# Patient Record
Sex: FEMALE | Race: WHITE | NOT HISPANIC OR LATINO | URBAN - METROPOLITAN AREA
[De-identification: names, ages, dates, MRNs, and addresses within clinical notes are randomized per-mention and may not be internally consistent; named-entity substitution may affect disease eponyms.]

---

## 2017-01-13 ENCOUNTER — APPOINTMENT (RX ONLY)
Dept: URBAN - METROPOLITAN AREA SURGERY 2 | Facility: SURGERY | Age: 72
Setting detail: DERMATOLOGY
End: 2017-01-13

## 2017-01-13 DIAGNOSIS — Z41.9 ENCOUNTER FOR PROCEDURE FOR PURPOSES OTHER THAN REMEDYING HEALTH STATE, UNSPECIFIED: ICD-10-CM

## 2017-01-13 PROBLEM — E78.5 HYPERLIPIDEMIA, UNSPECIFIED: Status: ACTIVE | Noted: 2017-01-13

## 2017-01-13 PROCEDURE — ? BOTOX

## 2017-01-13 PROCEDURE — ? FILLERS

## 2017-01-13 NOTE — PROCEDURE: BOTOX
Bill Summary Price Listed Below, Or Bill Total Of Units X Price Per Unit?: Bill #Units x Price Per Unit
Additional Area 3 Units: 0
Glabellar Complex Units: 1.5
Lot #: [id number]
Show Price In Note?: yes
Expiration Date (Month Year): 08/19
Consent: Written consent was obtained prior to the procedure. Risks, benefits, expectations and alternatives were discussed including, but not limited to, infection, bleeding, lid/brow ptosis, bruising, swelling, diplopia, temporary effects, incomplete chemical denervation and dissatisfaction with the cosmetic outcome. No guarantee or warranty was given or implied regarding longevity of results.
Map Statment: See attached map for complete details
Price Per Unit In $ (Use Numbers Only, No Text Please.): 17
Additional Area 1 Location: Eyebrows rhytids
Dilution (U/0.1 Cc): 5
Use Map Statement For Sites (Optional): No
Additional Area 2 Location: Chin
Detail Level: Simple
Administered By (Optional): Maria T Jaeger RN
Postcare Instructions: Patient instructed to not lie down for 4 hours and limit physical activity for 24 hours. Patient instructed not to travel by airplane for 48 hours.\\n\\n*****
Additional Area 3 Location: Eyebrow

## 2017-01-13 NOTE — PROCEDURE: FILLERS
Marionette Lines Filler Volume In Cc: 0
Additional Area 1 Volume In Cc: 0.2
Detail Level: Simple
Show Price In Note?: yes
Additional Area 1 Location: Glabella
Administered By (Optional): Maria T Jaeger
Additional Area 2 Location: Sabianism divot
Map Statment: See attached map for complete details
Additional Area 4 Location: [facility]
Use Map Statement For Sites (Optional): No
Filler: Juvederm Ultra XC
Consent: Written consent obtained. Risks include but not limited to bruising, beading, irregular texture, ulceration, infection, allergic reaction, scar formation, incomplete augmentation, temporary nature, procedural pain.
Lot #: [id number]
Price $ (Numeric Only, No Text Please.): 839
Expiration Date (Month Year): 03/18

## 2017-02-14 ENCOUNTER — APPOINTMENT (RX ONLY)
Dept: URBAN - METROPOLITAN AREA SURGERY 2 | Facility: SURGERY | Age: 72
Setting detail: DERMATOLOGY
End: 2017-02-14

## 2017-02-14 DIAGNOSIS — Z428 OTHER AFTERCARE INVOLVING THE USE OF PLASTIC SURGERY: ICD-10-CM

## 2017-02-14 DIAGNOSIS — Z41.9 ENCOUNTER FOR PROCEDURE FOR PURPOSES OTHER THAN REMEDYING HEALTH STATE, UNSPECIFIED: ICD-10-CM

## 2017-02-14 PROCEDURE — ? BOTOX

## 2017-02-14 PROCEDURE — ? FILLERS

## 2017-02-14 NOTE — PROCEDURE: FILLERS
Additional Area 4 Location: [facility]
Additional Area 4 Volume In Cc: 0
Map Statment: See attached map for complete details
Show Price In Note?: yes
Consent: Written consent obtained. Risks include but not limited to bruising, beading, irregular texture, ulceration, infection, allergic reaction, scar formation, incomplete augmentation, temporary nature, procedural pain.
Additional Area 1 Location: Periorbital scar
Use Map Statement For Sites (Optional): No
Administered By (Optional): Maria T Jaeger
Lot #: [id number]
Detail Level: Simple
Additional Area 2 Location: Oriental orthodox divot
Filler: Juvederm Ultra XC

## 2017-02-14 NOTE — PROCEDURE: BOTOX
Detail Level: Simple
Additional Area 1 Units: 0
Expiration Date (Month Year): 09/19
Map Statment: See attached map for complete details
Lot #: [id number]
Glabellar Complex Units: 10
Additional Area 1 Location: Eyebrows rhytids
Additional Area 3 Location: Eyebrow
Dilution (U/0.1 Cc): 5
Additional Area 2 Location: Chin
Postcare Instructions: Patient instructed to not lie down for 4 hours and limit physical activity for 24 hours. Patient instructed not to travel by airplane for 48 hours.\\n\\n*****
Use Map Statement For Sites (Optional): No
Price Per Unit In $ (Use Numbers Only, No Text Please.): 17
Bill Summary Price Listed Below, Or Bill Total Of Units X Price Per Unit?: Bill #Units x Price Per Unit
Consent: Written consent was obtained prior to the procedure. Risks, benefits, expectations and alternatives were discussed including, but not limited to, infection, bleeding, lid/brow ptosis, bruising, swelling, diplopia, temporary effects, incomplete chemical denervation and dissatisfaction with the cosmetic outcome. No guarantee or warranty was given or implied regarding longevity of results.
Show Price In Note?: yes
Administered By (Optional): Maria T Jaeger RN

## 2017-04-04 ENCOUNTER — APPOINTMENT (RX ONLY)
Dept: URBAN - METROPOLITAN AREA SURGERY 2 | Facility: SURGERY | Age: 72
Setting detail: DERMATOLOGY
End: 2017-04-04

## 2017-04-04 DIAGNOSIS — Z41.9 ENCOUNTER FOR PROCEDURE FOR PURPOSES OTHER THAN REMEDYING HEALTH STATE, UNSPECIFIED: ICD-10-CM

## 2017-04-04 PROBLEM — E78.5 HYPERLIPIDEMIA, UNSPECIFIED: Status: ACTIVE | Noted: 2017-04-04

## 2017-04-04 PROCEDURE — ? FILLERS

## 2017-04-04 NOTE — PROCEDURE: FILLERS
Additional Area 4 Location: [facility]
Topical Anesthesia?: yes
Mental Crease Filler Volume In Cc: 0
Use Map Statement For Sites (Optional): No
Consent: Written consent obtained. Risks include but not limited to bruising, beading, irregular texture, ulceration, infection, allergic reaction, scar formation, incomplete augmentation, temporary nature, procedural pain.
Detail Level: Simple
Additional Area 2 Location: Forehead depression
Additional Area 1 Volume In Cc: 0.1
Administered By (Optional): Maria T Jaeger
Lot #: [id number]
Additional Area 1 Location: Glabellar lines
Additional Area 1 Location: Nasal dorsum
Map Statment: See attached map for complete details
Filler: Juvederm Ultra XC

## 2017-06-20 ENCOUNTER — APPOINTMENT (RX ONLY)
Dept: URBAN - METROPOLITAN AREA SURGERY 2 | Facility: SURGERY | Age: 72
Setting detail: DERMATOLOGY
End: 2017-06-20

## 2017-06-20 DIAGNOSIS — Z41.9 ENCOUNTER FOR PROCEDURE FOR PURPOSES OTHER THAN REMEDYING HEALTH STATE, UNSPECIFIED: ICD-10-CM

## 2017-06-20 PROCEDURE — ? FILLERS

## 2017-06-20 PROCEDURE — ? BOTOX

## 2017-06-20 NOTE — PROCEDURE: BOTOX
Map Statment: See attached map for complete details
Platsyma Units: 0
Use Map Statement For Sites (Optional): No
Additional Area 3 Location: Left lower lid muscle
Postcare Instructions: Patient instructed to not lie down for 4 hours and limit physical activity for 24 hours. Patient instructed not to travel by airplane for 48 hours.\\n\\n*****
Forehead Units: 2.5
Expiration Date (Month Year): 01/20
Consent: Written consent was obtained prior to the procedure. Risks, benefits, expectations and alternatives were discussed including, but not limited to, infection, bleeding, lid/brow ptosis, bruising, swelling, diplopia, temporary effects, incomplete chemical denervation and dissatisfaction with the cosmetic outcome. No guarantee or warranty was given or implied regarding longevity of results.
Glabellar Complex Units: 12.5
Additional Area 1 Location: Necklace lines
Detail Level: Simple
Price Per Unit In $ (Use Numbers Only, No Text Please.): 17
Dilution (U/0.1 Cc): 5
Administered By (Optional): Maria T Jaeger RN
Additional Area 2 Location: Chin
Show Price In Note?: yes
Lot #: [id number]
Bill Summary Price Listed Below, Or Bill Total Of Units X Price Per Unit?: Bill #Units x Price Per Unit

## 2017-06-20 NOTE — PROCEDURE: FILLERS
Nasolabial Folds Filler Volume In Cc: 0
Additional Area 1 Location: Perioral
Detail Level: Simple
Map Statment: See attached map for complete details
Additional Area 5 Location: Lips
Additional Area 2 Location: ear lobes
Additional Area 3 Location: Ear lobe
Jawline Filler Volume In Cc: 0.2
Consent: Written consent obtained. Risks include but not limited to bruising, beading, irregular texture, ulceration, infection, allergic reaction, scar formation, incomplete augmentation, temporary nature, procedural pain.
Additional Area 4 Location: forehead depressions
Additional Area 1 Location: Oral commissures
Administered By (Optional): Maria T Jaeger
Use Map Statement For Sites (Optional): No
Topical Anesthesia?: yes
Expiration Date (Month Year): 03/2018
Topical Anesthetic Base: ointment
Filler: Juvederm Ultra XC
Lot #: [id number]
Additional Area 2 Location: Glabellar

## 2017-10-10 ENCOUNTER — APPOINTMENT (RX ONLY)
Dept: URBAN - METROPOLITAN AREA SURGERY 2 | Facility: SURGERY | Age: 72
Setting detail: DERMATOLOGY
End: 2017-10-10

## 2017-10-10 DIAGNOSIS — Z41.9 ENCOUNTER FOR PROCEDURE FOR PURPOSES OTHER THAN REMEDYING HEALTH STATE, UNSPECIFIED: ICD-10-CM

## 2017-10-10 PROCEDURE — ? BOTOX

## 2017-10-10 NOTE — PROCEDURE: BOTOX
Additional Area 3 Location: DTI
Additional Area 2 Location: Nasolabial fold
Additional Area 1 Location: chin
Lot #: [id number]
Consent: Written consent was obtained prior to the procedure. Risks, benefits, expectations and alternatives were discussed including, but not limited to, infection, bleeding, lid/brow ptosis, bruising, swelling, diplopia, temporary effects, incomplete chemical denervation and dissatisfaction with the cosmetic outcome. No guarantee or warranty was given or implied regarding longevity of results.
Dilution (U/0.1 Cc): 5
Nasal Root Units: 0
Price Per Unit In $ (Use Numbers Only, No Text Please.): 17
Glabellar Complex Units: 15
Show Price In Note?: yes
Use Map Statement For Sites (Optional): No
Expiration Date (Month Year): 04/20
Detail Level: Simple
Map Statment: See attached map for complete details
Bill Summary Price Listed Below, Or Bill Total Of Units X Price Per Unit?: Bill #Units x Price Per Unit
Postcare Instructions: Patient instructed to not lie down for 4 hours and limit physical activity for 24 hours. Patient instructed not to travel by airplane for 48 hours.\\n\\n*****
Administered By (Optional): Maria T Jaeger RN

## 2017-10-12 ENCOUNTER — APPOINTMENT (RX ONLY)
Dept: URBAN - METROPOLITAN AREA SURGERY 2 | Facility: SURGERY | Age: 72
Setting detail: DERMATOLOGY
End: 2017-10-12

## 2017-10-12 DIAGNOSIS — Z42.8 ENCOUNTER FOR OTHER PLASTIC AND RECONSTRUCTIVE SURGERY FOLLOWING MEDICAL PROCEDURE OR HEALED INJURY: ICD-10-CM

## 2017-10-12 PROBLEM — E78.5 HYPERLIPIDEMIA, UNSPECIFIED: Status: ACTIVE | Noted: 2017-10-12

## 2017-10-12 PROCEDURE — ? BOTOX

## 2017-10-12 NOTE — PROCEDURE: BOTOX
Additional Area 3 Location: DTI
Detail Level: Simple
Lot #: [id number]
Additional Area 2 Location: Nasolabial fold
Perioral Units: 0
Map Statment: See attached map for complete details
Administered By (Optional): Maria T Jaeger RN
Expiration Date (Month Year): 03/20
Postcare Instructions: Patient instructed to not lie down for 4 hours and limit physical activity for 24 hours. Patient instructed not to travel by airplane for 48 hours.\\n\\n*****
Dilution (U/0.1 Cc): 5
Bill Summary Price Listed Below, Or Bill Total Of Units X Price Per Unit?: Bill #Units x Price Per Unit
Show Price In Note?: yes
Glabellar Complex Units: 0.5
Use Map Statement For Sites (Optional): No
Consent: Written consent was obtained prior to the procedure. Risks, benefits, expectations and alternatives were discussed including, but not limited to, infection, bleeding, lid/brow ptosis, bruising, swelling, diplopia, temporary effects, incomplete chemical denervation and dissatisfaction with the cosmetic outcome. No guarantee or warranty was given or implied regarding longevity of results.
Additional Area 1 Location: chin

## 2018-01-16 ENCOUNTER — APPOINTMENT (RX ONLY)
Dept: URBAN - METROPOLITAN AREA CLINIC 12 | Facility: CLINIC | Age: 73
Setting detail: DERMATOLOGY
End: 2018-01-16

## 2018-01-16 DIAGNOSIS — Z41.9 ENCOUNTER FOR PROCEDURE FOR PURPOSES OTHER THAN REMEDYING HEALTH STATE, UNSPECIFIED: ICD-10-CM

## 2018-01-16 PROCEDURE — ? BOTOX

## 2018-01-16 NOTE — PROCEDURE: BOTOX
Glabellar Complex Units: 12.5
Additional Area 3 Location: necklace lines
Platsyma Units: 0
Additional Area 1 Location: chin
Lot #: [id number]
Show Price In Note?: yes
Postcare Instructions: Patient instructed to not lie down for 4 hours and limit physical activity for 24 hours. Patient instructed not to travel by airplane for 48 hours.
Consent: Written consent was obtained prior to the procedure. Risks, benefits, expectations and alternatives were discussed including, but not limited to, infection, bleeding, lid/brow ptosis, bruising, swelling, diplopia, temporary effects, incomplete chemical denervation and dissatisfaction with the cosmetic outcome. No guarantee or warranty was given or implied regarding longevity of results.
Bill Summary Price Listed Below, Or Bill Total Of Units X Price Per Unit?: Bill #Units x Price Per Unit
Topical Anesthesia?: 23% lidocaine, 7% tetracaine
Dilution (U/0.1 Cc): 5
Administered By (Optional): Maria T Jaeger
Price Per Unit In $ (Use Numbers Only, No Text Please.): 17
Detail Level: Simple
Forehead Units: 2.5
Periorbital Skin Units: 7.5
Map Statment: See attached map for complete details
Additional Area 2 Location: muscle spasm
Expiration Date (Month Year): 08/20

## 2018-04-17 ENCOUNTER — APPOINTMENT (RX ONLY)
Dept: URBAN - METROPOLITAN AREA CLINIC 12 | Facility: CLINIC | Age: 73
Setting detail: DERMATOLOGY
End: 2018-04-17

## 2018-04-17 DIAGNOSIS — Z41.9 ENCOUNTER FOR PROCEDURE FOR PURPOSES OTHER THAN REMEDYING HEALTH STATE, UNSPECIFIED: ICD-10-CM

## 2018-04-17 PROCEDURE — ? BOTOX

## 2018-04-17 NOTE — PROCEDURE: BOTOX
Price Per Unit In $ (Use Numbers Only, No Text Please.): 17
Administered By (Optional): Maria T Jaeger
Dilution (U/0.1 Cc): 5
Glabellar Complex Units: 0
Show Price In Note?: yes
Map Statment: See attached map for complete details
Additional Area 2 Location: nasalis depressor
Bill Summary Price Listed Below, Or Bill Total Of Units X Price Per Unit?: Bill #Units x Price Per Unit
Lot #: [id number]
Expiration Date (Month Year): November 2020
Length Of Topical Anesthesia Application (Optional): 10 minutes
Forehead Units: 15
Periorbital Skin Units: 10
Additional Area 3 Location: tail end of brows
Postcare Instructions: Patient instructed to not lie down for 4 hours and limit physical activity for 24 hours. Patient instructed not to travel by airplane for 48 hours.
Consent: Written consent was obtained prior to the procedure. Risks, benefits, expectations and alternatives were discussed including, but not limited to, infection, bleeding, lid/brow ptosis, bruising, swelling, diplopia, temporary effects, incomplete chemical denervation and dissatisfaction with the cosmetic outcome. No guarantee or warranty was given or implied regarding longevity of results.
Topical Anesthesia?: 20% benzocaine, 8% lidocaine, 4% tetracaine
Detail Level: Simple
Additional Area 1 Location: chin

## 2018-04-25 ENCOUNTER — APPOINTMENT (RX ONLY)
Dept: URBAN - METROPOLITAN AREA CLINIC 12 | Facility: CLINIC | Age: 73
Setting detail: DERMATOLOGY
End: 2018-04-25

## 2018-04-25 PROCEDURE — ? CHEMICAL PEEL (CUSTOM)

## 2018-04-25 NOTE — PROCEDURE: CHEMICAL PEEL (CUSTOM)
Prep: Patient's face was washed with LHA Cleanser and prepped with prepping solution prior to treatment.  The entire face and neck were then dermaplaned with a sterile dermaplane blade.  Vaseline was then applied for protection of mucous membranes.
Number Of Coats: 2
Detail Level: Zone
Frostin
Price $ (Use Numbers Only, No Text Please.): 180
Erythema: mild
Consent: Prior to the procedure, written consent was obtained and risks, benefits, expectations and alternatives were reviewed, including, but not limited to,  redness, peeling, blistering, pigmentary change, scarring, infection, and pain.
Treatment Number: 1

## 2018-06-14 ENCOUNTER — APPOINTMENT (RX ONLY)
Dept: URBAN - METROPOLITAN AREA CLINIC 12 | Facility: CLINIC | Age: 73
Setting detail: DERMATOLOGY
End: 2018-06-14

## 2018-06-14 DIAGNOSIS — Z41.9 ENCOUNTER FOR PROCEDURE FOR PURPOSES OTHER THAN REMEDYING HEALTH STATE, UNSPECIFIED: ICD-10-CM

## 2018-06-14 PROCEDURE — ? CHEMICAL PEEL (CUSTOM)

## 2018-06-14 NOTE — PROCEDURE: CHEMICAL PEEL (CUSTOM)
Detail Level: Zone
Treatment Number: 2
Frostin
Prep: Patient's face was washed with GentlenCleanser and prepped with prepping solution prior to treatment.  The entire face and neck were then dermaplaned with a sterile dermaplane blade.  Vaseline was then applied for protection of mucous membranes.
Consent: Prior to the procedure, written consent was obtained and risks, benefits, expectations and alternatives were reviewed, including, but not limited to,  redness, peeling, blistering, pigmentary change, scarring, infection, and pain.
Price $ (Use Numbers Only, No Text Please.): 180
Erythema: mild

## 2018-06-20 ENCOUNTER — APPOINTMENT (RX ONLY)
Dept: URBAN - METROPOLITAN AREA CLINIC 12 | Facility: CLINIC | Age: 73
Setting detail: DERMATOLOGY
End: 2018-06-20

## 2018-06-20 DIAGNOSIS — Z41.9 ENCOUNTER FOR PROCEDURE FOR PURPOSES OTHER THAN REMEDYING HEALTH STATE, UNSPECIFIED: ICD-10-CM

## 2018-06-20 PROCEDURE — ? BOTOX

## 2018-06-20 NOTE — PROCEDURE: BOTOX
Periorbital Skin Units: 10
Additional Area 1 Units: 0
Price Per Unit In $ (Use Numbers Only, No Text Please.): 17
Additional Area 2 Location: nasalis depressor
Administered By (Optional): Maria T Jaeger
Additional Area 3 Location: orbicularis
Show Price In Note?: yes
Topical Anesthesia?: 23% lidocaine, 7% tetracaine
Lot #: [id number]
Bill Summary Price Listed Below, Or Bill Total Of Units X Price Per Unit?: Bill #Units x Price Per Unit
Detail Level: Simple
Map Statment: See attached map for complete details
Dilution (U/0.1 Cc): 5
Postcare Instructions: Patient instructed to not lie down for 4 hours and limit physical activity for 24 hours. Patient instructed not to travel by airplane for 48 hours.
Additional Area 1 Location: chin
Expiration Date (Month Year): 12/2020
Consent: Written consent was obtained prior to the procedure. Risks, benefits, expectations and alternatives were discussed including, but not limited to, infection, bleeding, lid/brow ptosis, bruising, swelling, diplopia, temporary effects, incomplete chemical denervation and dissatisfaction with the cosmetic outcome. No guarantee or warranty was given or implied regarding longevity of results.

## 2018-08-09 ENCOUNTER — APPOINTMENT (RX ONLY)
Dept: URBAN - METROPOLITAN AREA CLINIC 12 | Facility: CLINIC | Age: 73
Setting detail: DERMATOLOGY
End: 2018-08-09

## 2018-08-09 DIAGNOSIS — Z41.9 ENCOUNTER FOR PROCEDURE FOR PURPOSES OTHER THAN REMEDYING HEALTH STATE, UNSPECIFIED: ICD-10-CM

## 2018-08-09 PROCEDURE — ? CHEMICAL PEEL

## 2018-08-09 ASSESSMENT — LOCATION ZONE DERM
LOCATION ZONE: NECK
LOCATION ZONE: FACE

## 2018-08-09 ASSESSMENT — LOCATION SIMPLE DESCRIPTION DERM
LOCATION SIMPLE: SUPERIOR FOREHEAD
LOCATION SIMPLE: RIGHT CHEEK
LOCATION SIMPLE: LEFT CHEEK
LOCATION SIMPLE: LEFT ANTERIOR NECK

## 2018-08-09 ASSESSMENT — LOCATION DETAILED DESCRIPTION DERM
LOCATION DETAILED: LEFT INFERIOR CENTRAL MALAR CHEEK
LOCATION DETAILED: RIGHT CENTRAL MALAR CHEEK
LOCATION DETAILED: SUPERIOR MID FOREHEAD
LOCATION DETAILED: LEFT SUPERIOR ANTERIOR NECK

## 2018-08-09 NOTE — PROCEDURE: CHEMICAL PEEL
Post Peel Care: After the procedure  EltaMD Sport was applied to the treated areas. Sun protection and post-care instructions were reviewed with the patient.
How Many Applications? (If You Leave As 0, Will Not Render): 2
Chemical Peel: Micropeel Sensitive
Total Treatments Expected: 4
Treatment Number: 3
Detail Level: Simple
Consent: Prior to the procedure, written consent was obtained and risks, benefits, expectations and alternatives were reviewed, including, but not limited to,  redness, peeling, blistering, pigmentary change, scarring, infection, and pain.

## 2018-09-24 ENCOUNTER — APPOINTMENT (RX ONLY)
Dept: URBAN - METROPOLITAN AREA CLINIC 12 | Facility: CLINIC | Age: 73
Setting detail: DERMATOLOGY
End: 2018-09-24

## 2018-10-01 ENCOUNTER — APPOINTMENT (RX ONLY)
Dept: URBAN - METROPOLITAN AREA CLINIC 12 | Facility: CLINIC | Age: 73
Setting detail: DERMATOLOGY
End: 2018-10-01

## 2018-10-01 DIAGNOSIS — Z41.9 ENCOUNTER FOR PROCEDURE FOR PURPOSES OTHER THAN REMEDYING HEALTH STATE, UNSPECIFIED: ICD-10-CM

## 2018-10-01 PROCEDURE — ? CHEMICAL PEEL

## 2018-10-01 ASSESSMENT — LOCATION SIMPLE DESCRIPTION DERM
LOCATION SIMPLE: RIGHT ANTERIOR NECK
LOCATION SIMPLE: LEFT CHEEK
LOCATION SIMPLE: CHIN
LOCATION SIMPLE: RIGHT CHEEK
LOCATION SIMPLE: RIGHT FOREHEAD
LOCATION SIMPLE: LEFT ANTERIOR NECK

## 2018-10-01 ASSESSMENT — LOCATION DETAILED DESCRIPTION DERM
LOCATION DETAILED: RIGHT INFERIOR CENTRAL MALAR CHEEK
LOCATION DETAILED: LEFT SUPERIOR LATERAL NECK
LOCATION DETAILED: LEFT INFERIOR CENTRAL MALAR CHEEK
LOCATION DETAILED: LEFT CHIN
LOCATION DETAILED: RIGHT INFERIOR MEDIAL FOREHEAD
LOCATION DETAILED: RIGHT SUPERIOR LATERAL NECK

## 2018-10-01 ASSESSMENT — LOCATION ZONE DERM
LOCATION ZONE: FACE
LOCATION ZONE: NECK

## 2018-10-01 NOTE — PROCEDURE: CHEMICAL PEEL
Total Treatments Expected: 4
Consent: Prior to the procedure, written consent was obtained and risks, benefits, expectations and alternatives were reviewed, including, but not limited to,  redness, peeling, blistering, pigmentary change, scarring, infection, and pain.
Detail Level: Simple
How Many Applications? (If You Leave As 0, Will Not Render): 2
Price $ (Use Numbers Only, No Text Please.): 0
Chemical Peel: Micropeel Sensitive
Post Peel Care: After the procedure  EltaMD Sport was applied to the treated areas. Sun protection and post-care instructions were reviewed with the patient.

## 2018-11-19 ENCOUNTER — APPOINTMENT (RX ONLY)
Dept: URBAN - METROPOLITAN AREA CLINIC 12 | Facility: CLINIC | Age: 73
Setting detail: DERMATOLOGY
End: 2018-11-19

## 2018-11-19 DIAGNOSIS — Z41.9 ENCOUNTER FOR PROCEDURE FOR PURPOSES OTHER THAN REMEDYING HEALTH STATE, UNSPECIFIED: ICD-10-CM

## 2018-11-19 PROCEDURE — ? BOTOX

## 2018-11-19 NOTE — PROCEDURE: BOTOX
Show Price In Note?: yes
Glabellar Complex Units: 10
Perioral Units: 0
Consent: Written consent was obtained prior to the procedure. Risks, benefits, expectations and alternatives were discussed including, but not limited to, infection, bleeding, lid/brow ptosis, bruising, swelling, diplopia, temporary effects, incomplete chemical denervation and dissatisfaction with the cosmetic outcome. No guarantee or warranty was given or implied regarding longevity of results.
Topical Anesthesia?: 23% lidocaine, 7% tetracaine
Bill Summary Price Listed Below, Or Bill Total Of Units X Price Per Unit?: Bill #Units x Price Per Unit
Additional Area 3 Location: nasalis depressor
Map Statment: See attached map for complete details
Forehead Units: 5
Additional Area 2 Location: tail end of brow
Administered By (Optional): Maria T Jaeger
Length Of Topical Anesthesia Application (Optional): 15 minutes
Lot #: [id number]
Detail Level: Simple
Postcare Instructions: Patient instructed to not lie down for 4 hours and limit physical activity for 24 hours. Patient instructed not to travel by airplane for 48 hours.
Expiration Date (Month Year): 05/21
Price Per Unit In $ (Use Numbers Only, No Text Please.): 17
Additional Area 1 Location: [facility]

## 2019-01-24 ENCOUNTER — APPOINTMENT (RX ONLY)
Dept: URBAN - METROPOLITAN AREA CLINIC 12 | Facility: CLINIC | Age: 74
Setting detail: DERMATOLOGY
End: 2019-01-24

## 2019-01-24 PROCEDURE — ? CHEMICAL PEEL

## 2019-01-24 NOTE — PROCEDURE: CHEMICAL PEEL
Total Treatments Expected: 4
How Many Applications? (If You Leave As 0, Will Not Render): 2
Treatment Number: 1
Post Peel Care: After the procedure  EltaMD Sport was applied to the treated areas. Sun protection and post-care instructions were reviewed with the patient.
Price $ (Use Numbers Only, No Text Please.): 180
Consent: Prior to the procedure, written consent was obtained and risks, benefits, expectations and alternatives were reviewed, including, but not limited to,  redness, peeling, blistering, pigmentary change, scarring, infection, and pain.
Chemical Peel: Micropeel Sensitive
Detail Level: Simple

## 2019-03-22 ENCOUNTER — APPOINTMENT (RX ONLY)
Dept: URBAN - METROPOLITAN AREA CLINIC 12 | Facility: CLINIC | Age: 74
Setting detail: DERMATOLOGY
End: 2019-03-22

## 2019-03-22 DIAGNOSIS — Z41.9 ENCOUNTER FOR PROCEDURE FOR PURPOSES OTHER THAN REMEDYING HEALTH STATE, UNSPECIFIED: ICD-10-CM

## 2019-03-22 PROCEDURE — ? CHEMICAL PEEL

## 2019-03-22 ASSESSMENT — LOCATION DETAILED DESCRIPTION DERM
LOCATION DETAILED: LEFT INFERIOR CENTRAL MALAR CHEEK
LOCATION DETAILED: RIGHT INFERIOR CENTRAL MALAR CHEEK
LOCATION DETAILED: RIGHT CHIN
LOCATION DETAILED: INFERIOR MID FOREHEAD

## 2019-03-22 ASSESSMENT — LOCATION SIMPLE DESCRIPTION DERM
LOCATION SIMPLE: LEFT CHEEK
LOCATION SIMPLE: CHIN
LOCATION SIMPLE: RIGHT CHEEK
LOCATION SIMPLE: INFERIOR FOREHEAD

## 2019-03-22 ASSESSMENT — LOCATION ZONE DERM: LOCATION ZONE: FACE

## 2019-03-22 NOTE — PROCEDURE: CHEMICAL PEEL
Post Peel Care: After the procedure  EltaMD Sport was applied to the treated areas. Sun protection and post-care instructions were reviewed with the patient.
How Many Applications? (If You Leave As 0, Will Not Render): 2
Total Treatments Expected: 4
Detail Level: Simple
Consent: Prior to the procedure, written consent was obtained and risks, benefits, expectations and alternatives were reviewed, including, but not limited to,  redness, peeling, blistering, pigmentary change, scarring, infection, and pain.
Price $ (Use Numbers Only, No Text Please.): 180
Chemical Peel: Micropeel Sensitive

## 2019-04-15 ENCOUNTER — APPOINTMENT (RX ONLY)
Dept: URBAN - METROPOLITAN AREA CLINIC 12 | Facility: CLINIC | Age: 74
Setting detail: DERMATOLOGY
End: 2019-04-15

## 2019-04-15 DIAGNOSIS — Z41.9 ENCOUNTER FOR PROCEDURE FOR PURPOSES OTHER THAN REMEDYING HEALTH STATE, UNSPECIFIED: ICD-10-CM

## 2019-04-15 PROCEDURE — ? BOTOX

## 2019-04-15 NOTE — PROCEDURE: BOTOX
Topical Anesthesia?: 23% lidocaine, 7% tetracaine
Nasal Root Units: 0
Price Per Unit In $ (Use Numbers Only, No Text Please.): 17
Periorbital Skin Units: 10
Additional Area 3 Location: necklace lines
Expiration Date (Month Year): 08/2021
Administered By (Optional): Maria T Jaeger
Consent: Written consent was obtained prior to the procedure. Risks, benefits, expectations and alternatives were discussed including, but not limited to, infection, bleeding, lid/brow ptosis, bruising, swelling, diplopia, temporary effects, incomplete chemical denervation and dissatisfaction with the cosmetic outcome. No guarantee or warranty was given or implied regarding longevity of results.
Postcare Instructions: Patient instructed to not lie down for 4 hours and limit physical activity for 24 hours. Patient instructed not to travel by airplane for 48 hours.
Show Price In Note?: yes
Lot #: [id number]
Additional Area 1 Location: tail of brow
Glabellar Complex Units: 12.5
Bill Summary Price Listed Below, Or Bill Total Of Units X Price Per Unit?: Bill #Units x Price Per Unit
Forehead Units: 2.5
Dilution (U/0.1 Cc): 5
Map Statment: See attached map for complete details
Detail Level: Simple
Additional Area 2 Location: Chin

## 2019-06-17 ENCOUNTER — APPOINTMENT (RX ONLY)
Dept: URBAN - METROPOLITAN AREA CLINIC 12 | Facility: CLINIC | Age: 74
Setting detail: DERMATOLOGY
End: 2019-06-17

## 2019-06-17 DIAGNOSIS — Z41.9 ENCOUNTER FOR PROCEDURE FOR PURPOSES OTHER THAN REMEDYING HEALTH STATE, UNSPECIFIED: ICD-10-CM

## 2019-06-17 PROCEDURE — ? CHEMICAL PEEL

## 2019-06-17 ASSESSMENT — LOCATION ZONE DERM: LOCATION ZONE: FACE

## 2019-06-17 ASSESSMENT — LOCATION SIMPLE DESCRIPTION DERM
LOCATION SIMPLE: RIGHT CHEEK
LOCATION SIMPLE: LEFT CHEEK
LOCATION SIMPLE: RIGHT FOREHEAD
LOCATION SIMPLE: CHIN
LOCATION SIMPLE: SUBMENTAL CHIN

## 2019-06-17 ASSESSMENT — LOCATION DETAILED DESCRIPTION DERM
LOCATION DETAILED: RIGHT INFERIOR CENTRAL MALAR CHEEK
LOCATION DETAILED: RIGHT CHIN
LOCATION DETAILED: LEFT INFERIOR CENTRAL MALAR CHEEK
LOCATION DETAILED: SUBMENTAL CHIN
LOCATION DETAILED: RIGHT MEDIAL FOREHEAD

## 2019-06-17 NOTE — PROCEDURE: CHEMICAL PEEL
Consent: Prior to the procedure, written consent was obtained and risks, benefits, expectations and alternatives were reviewed, including, but not limited to,  redness, peeling, blistering, pigmentary change, scarring, infection, and pain.
Total Treatments Expected: 4
Treatment Number: 3
Detail Level: Simple
How Many Applications? (If You Leave As 0, Will Not Render): 1
Chemical Peel: Micropeel Sensitive
Post Peel Care: After the procedure Physical UV Defense  was applied to the treated areas. Sun protection and post-care instructions were reviewed with the patient.

## 2019-08-15 ENCOUNTER — APPOINTMENT (RX ONLY)
Dept: URBAN - METROPOLITAN AREA CLINIC 12 | Facility: CLINIC | Age: 74
Setting detail: DERMATOLOGY
End: 2019-08-15

## 2019-08-15 DIAGNOSIS — Z41.9 ENCOUNTER FOR PROCEDURE FOR PURPOSES OTHER THAN REMEDYING HEALTH STATE, UNSPECIFIED: ICD-10-CM

## 2019-08-15 PROCEDURE — ? CHEMICAL PEEL

## 2019-08-15 NOTE — PROCEDURE: CHEMICAL PEEL
Total Treatments Expected: 4
Chemical Peel: Micropeel Sensitive
Post Peel Care: After the procedure Physical UV Defense  was applied to the treated areas. Sun protection and post-care instructions were reviewed with the patient.
Detail Level: Simple
Consent: Prior to the procedure, written consent was obtained and risks, benefits, expectations and alternatives were reviewed, including, but not limited to,  redness, peeling, blistering, pigmentary change, scarring, infection, and pain.
How Many Applications? (If You Leave As 0, Will Not Render): 2

## 2019-09-23 ENCOUNTER — APPOINTMENT (RX ONLY)
Dept: URBAN - METROPOLITAN AREA CLINIC 12 | Facility: CLINIC | Age: 74
Setting detail: DERMATOLOGY
End: 2019-09-23

## 2019-09-23 DIAGNOSIS — Z41.9 ENCOUNTER FOR PROCEDURE FOR PURPOSES OTHER THAN REMEDYING HEALTH STATE, UNSPECIFIED: ICD-10-CM

## 2019-09-23 PROCEDURE — ? CHEMICAL PEEL

## 2019-09-23 ASSESSMENT — LOCATION DETAILED DESCRIPTION DERM
LOCATION DETAILED: LEFT MEDIAL FOREHEAD
LOCATION DETAILED: LEFT CHIN
LOCATION DETAILED: LEFT LATERAL MALAR CHEEK
LOCATION DETAILED: RIGHT CENTRAL MALAR CHEEK

## 2019-09-23 ASSESSMENT — LOCATION ZONE DERM: LOCATION ZONE: FACE

## 2019-09-23 ASSESSMENT — LOCATION SIMPLE DESCRIPTION DERM
LOCATION SIMPLE: CHIN
LOCATION SIMPLE: LEFT CHEEK
LOCATION SIMPLE: RIGHT CHEEK
LOCATION SIMPLE: LEFT FOREHEAD

## 2019-09-23 NOTE — PROCEDURE: CHEMICAL PEEL
Price $ (Use Numbers Only, No Text Please.): 115
How Many Applications? (If You Leave As 0, Will Not Render): 3
Post Peel Care: After the procedure  EltaMD Sport was applied. Sun protection and post-care instructions were reviewed with the patient.
Detail Level: Simple
Chemical Peel: Micropeel Sensitive
Consent: Prior to the procedure, written consent was obtained and risks, benefits, expectations and alternatives were reviewed, including, but not limited to,  redness, peeling, blistering, pigmentary change, scarring, infection, and pain.

## 2019-09-27 ENCOUNTER — APPOINTMENT (RX ONLY)
Dept: URBAN - METROPOLITAN AREA CLINIC 12 | Facility: CLINIC | Age: 74
Setting detail: DERMATOLOGY
End: 2019-09-27

## 2019-09-27 DIAGNOSIS — Z41.9 ENCOUNTER FOR PROCEDURE FOR PURPOSES OTHER THAN REMEDYING HEALTH STATE, UNSPECIFIED: ICD-10-CM

## 2019-09-27 PROCEDURE — ? BOTOX

## 2019-09-27 NOTE — PROCEDURE: BOTOX
Additional Area 1 Location: in eyebrows
Giuseppe Units: 0
Lot #: [id number]
Expiration Date (Month Year): 02/2022
Map Statment: See attached map for complete details
Forehead Units: 2.5
Bill Summary Price Listed Below, Or Bill Total Of Units X Price Per Unit?: Bill #Units x Price Per Unit
Glabellar Complex Units: 12.5
Administered By (Optional): Maria T Jaeger
Topical Anesthesia?: 23% lidocaine, 7% tetracaine
Additional Area 3 Location: necklace lines
Use Map Statement For Sites (Optional): Yes
Consent: Written consent was obtained prior to the procedure. Risks, benefits, expectations and alternatives were discussed including, but not limited to, infection, bleeding, lid/brow ptosis, bruising, swelling, diplopia, temporary effects, incomplete chemical denervation and dissatisfaction with the cosmetic outcome. No guarantee or warranty was given or implied regarding longevity of results.
Postcare Instructions: Patient instructed to not lie down for 4 hours and limit physical activity for 24 hours. Patient instructed not to travel by airplane for 48 hours.
Dilution (U/0.1 Cc): 5
Additional Area 2 Location: Chin
Detail Level: Simple
Periorbital Skin Units: 10
Price Per Unit In $ (Use Numbers Only, No Text Please.): 17

## 2019-12-10 ENCOUNTER — APPOINTMENT (RX ONLY)
Dept: URBAN - METROPOLITAN AREA CLINIC 12 | Facility: CLINIC | Age: 74
Setting detail: DERMATOLOGY
End: 2019-12-10

## 2019-12-10 DIAGNOSIS — Z41.9 ENCOUNTER FOR PROCEDURE FOR PURPOSES OTHER THAN REMEDYING HEALTH STATE, UNSPECIFIED: ICD-10-CM

## 2019-12-10 PROCEDURE — ? DERMAPLANING

## 2019-12-10 ASSESSMENT — LOCATION SIMPLE DESCRIPTION DERM
LOCATION SIMPLE: INFERIOR FOREHEAD
LOCATION SIMPLE: LEFT CHEEK
LOCATION SIMPLE: CHIN
LOCATION SIMPLE: RIGHT CHEEK

## 2019-12-10 ASSESSMENT — LOCATION DETAILED DESCRIPTION DERM
LOCATION DETAILED: INFERIOR MID FOREHEAD
LOCATION DETAILED: LEFT CHIN
LOCATION DETAILED: LEFT INFERIOR CENTRAL MALAR CHEEK
LOCATION DETAILED: RIGHT INFERIOR CENTRAL MALAR CHEEK

## 2019-12-10 ASSESSMENT — LOCATION ZONE DERM: LOCATION ZONE: FACE

## 2019-12-10 NOTE — PROCEDURE: DERMAPLANING
Treatment Number: 1
Blade Type: #10
Post-Treatment (Other): SPF 30
Consent: Written consent obtained, risks reviewed including, but not limited to, crusting, scabbing, blistering, scarring, darker or lighter pigmentary change, bruising, and/or incomplete response.
Detail Level: Zone
Number Of Passes: 2
Indication: rhytids
Price $ (Use Numbers Only, No Text Please.): 100

## 2020-01-31 ENCOUNTER — APPOINTMENT (RX ONLY)
Dept: URBAN - METROPOLITAN AREA CLINIC 12 | Facility: CLINIC | Age: 75
Setting detail: DERMATOLOGY
End: 2020-01-31

## 2020-01-31 DIAGNOSIS — Z41.9 ENCOUNTER FOR PROCEDURE FOR PURPOSES OTHER THAN REMEDYING HEALTH STATE, UNSPECIFIED: ICD-10-CM

## 2020-01-31 PROCEDURE — ? BOTOX

## 2020-01-31 NOTE — PROCEDURE: BOTOX
Additional Area 2 Location: Chin
Periorbital Skin Units: 10
Show Price In Note?: yes
Additional Area 2 Units: 0
Map Statment: See attached map for complete details
Lot #: [id number]
Administered By (Optional): Maria T Jaeger
Additional Area 1 Location: sneer muscles
Forehead Units: 2.5
Expiration Date (Month Year): 08/2022
Topical Anesthesia?: 23% lidocaine, 7% tetracaine
Additional Area 3 Location: necklace lines
Consent: Written consent was obtained prior to the procedure. Risks, benefits, expectations and alternatives were discussed including, but not limited to, infection, bleeding, lid/brow ptosis, bruising, swelling, diplopia, temporary effects, incomplete chemical denervation and dissatisfaction with the cosmetic outcome. No guarantee or warranty was given or implied regarding longevity of results.
Postcare Instructions: Patient instructed to not lie down for 4 hours and limit physical activity for 24 hours. Patient instructed not to travel by airplane for 48 hours.
Glabellar Complex Units: 12.5
Bill Summary Price Listed Below, Or Bill Total Of Units X Price Per Unit?: Bill #Units x Price Per Unit
Detail Level: Simple
Price Per Unit In $ (Use Numbers Only, No Text Please.): 17
Dilution (U/0.1 Cc): 5

## 2020-06-04 ENCOUNTER — APPOINTMENT (RX ONLY)
Dept: URBAN - METROPOLITAN AREA CLINIC 12 | Facility: CLINIC | Age: 75
Setting detail: DERMATOLOGY
End: 2020-06-04

## 2020-06-04 DIAGNOSIS — Z41.9 ENCOUNTER FOR PROCEDURE FOR PURPOSES OTHER THAN REMEDYING HEALTH STATE, UNSPECIFIED: ICD-10-CM

## 2020-06-04 PROCEDURE — ? BOTOX

## 2020-06-04 NOTE — PROCEDURE: BOTOX
Nasal Root Units: 0
Consent: Written consent was obtained prior to the procedure. Risks, benefits, expectations and alternatives were discussed including, but not limited to, infection, bleeding, lid/brow ptosis, bruising, swelling, diplopia, temporary effects, incomplete chemical denervation and dissatisfaction with the cosmetic outcome. No guarantee or warranty was given or implied regarding longevity of results.
Additional Area 1 Location: in the brow
Bill Summary Price Listed Below, Or Bill Total Of Units X Price Per Unit?: Bill #Units x Price Per Unit
Expiration Date (Month Year): 01/2023
Periorbital Skin Units: 10
Use Map Statement For Sites (Optional): Yes
Administered By (Optional): Maria T Jaeger
Lot #: [id number]
Additional Area 2 Location: Chin
Price Per Unit In $ (Use Numbers Only, No Text Please.): 17
Dilution (U/0.1 Cc): 5
Additional Area 3 Location: necklace lines
Map Statment: See attached map for complete details
Detail Level: Simple
Postcare Instructions: Patient instructed to not lie down for 4 hours and limit physical activity for 24 hours. Patient instructed not to travel by airplane for 48 hours.

## 2020-11-17 ENCOUNTER — APPOINTMENT (RX ONLY)
Dept: URBAN - METROPOLITAN AREA CLINIC 12 | Facility: CLINIC | Age: 75
Setting detail: DERMATOLOGY
End: 2020-11-17

## 2020-11-17 DIAGNOSIS — Z41.9 ENCOUNTER FOR PROCEDURE FOR PURPOSES OTHER THAN REMEDYING HEALTH STATE, UNSPECIFIED: ICD-10-CM

## 2020-11-17 PROCEDURE — ? BOTOX

## 2020-11-17 NOTE — PROCEDURE: BOTOX
Expiration Date (Month Year): 08/2023
Consent: Written consent was obtained prior to the procedure. Risks, benefits, expectations and alternatives were discussed including, but not limited to, infection, bleeding, lid/brow ptosis, bruising, swelling, diplopia, temporary effects, incomplete chemical denervation and dissatisfaction with the cosmetic outcome. No guarantee or warranty was given or implied regarding longevity of results.
Glabellar Complex Units: 12.5
Perioral Units: 0
Postcare Instructions: Patient instructed to not lie down for 4 hours and limit physical activity for 24 hours. Patient instructed not to travel by airplane for 48 hours.
Detail Level: Simple
Lot #: [id number]
Use Map Statement For Sites (Optional): Yes
Forehead Units: 2.5
Additional Area 1 Location: cheek attachment with striations
Topical Anesthesia?: 23% lidocaine, 7% tetracaine
Map Statment: See attached map for complete details
Administered By (Optional): Maria T Jaeger
Dilution (U/0.1 Cc): 5
Additional Area 2 Location: Chin
Bill Summary Price Listed Below, Or Bill Total Of Units X Price Per Unit?: Bill #Units x Price Per Unit
Periorbital Skin Units: 15
Price Per Unit In $ (Use Numbers Only, No Text Please.): 17
Additional Area 3 Location: tail end of brow

## 2021-04-27 ENCOUNTER — APPOINTMENT (RX ONLY)
Dept: URBAN - METROPOLITAN AREA CLINIC 12 | Facility: CLINIC | Age: 76
Setting detail: DERMATOLOGY
End: 2021-04-27

## 2021-04-27 DIAGNOSIS — Z41.9 ENCOUNTER FOR PROCEDURE FOR PURPOSES OTHER THAN REMEDYING HEALTH STATE, UNSPECIFIED: ICD-10-CM

## 2021-04-27 PROCEDURE — ? BOTOX

## 2021-04-27 NOTE — PROCEDURE: BOTOX
Show Price In Note?: yes
Lot #: [id number]
Platsyma Units: 0
Administered By (Optional): Maria T Jaeger
Additional Area 3 Location: masseter muscles
Periorbital Skin Units: 15
Additional Area 1 Location: tail end of brows
Additional Area 2 Location: Chin
Consent: Written consent was obtained prior to the procedure. Risks, benefits, expectations and alternatives were discussed including, but not limited to, infection, bleeding, lid/brow ptosis, bruising, swelling, diplopia, temporary effects, incomplete chemical denervation and dissatisfaction with the cosmetic outcome. No guarantee or warranty was given or implied regarding longevity of results.
Forehead Units: 5
Map Statment: See attached map for complete details
Length Of Topical Anesthesia Application (Optional): 10 minutes
Expiration Date (Month Year): 07/2023
Detail Level: Simple
Topical Anesthesia?: 23% lidocaine, 7% tetracaine
Glabellar Complex Units: 10
Bill Summary Price Listed Below, Or Bill Total Of Units X Price Per Unit?: Bill #Units x Price Per Unit
Postcare Instructions: Patient instructed to not lie down for 4 hours and limit physical activity for 24 hours. Patient instructed not to travel by airplane for 48 hours.
Price Per Unit In $ (Use Numbers Only, No Text Please.): 17

## 2022-01-13 ENCOUNTER — APPOINTMENT (RX ONLY)
Dept: URBAN - METROPOLITAN AREA CLINIC 12 | Facility: CLINIC | Age: 77
Setting detail: DERMATOLOGY
End: 2022-01-13

## 2022-01-13 DIAGNOSIS — Z41.9 ENCOUNTER FOR PROCEDURE FOR PURPOSES OTHER THAN REMEDYING HEALTH STATE, UNSPECIFIED: ICD-10-CM

## 2022-01-13 PROCEDURE — ? BOTOX

## 2022-01-13 NOTE — PROCEDURE: BOTOX
Consent: Written consent was obtained prior to the procedure. Risks, benefits, expectations and alternatives were discussed including, but not limited to, infection, bleeding, lid/brow ptosis, bruising, swelling, diplopia, temporary effects, incomplete chemical denervation and dissatisfaction with the cosmetic outcome. No guarantee or warranty was given or implied regarding longevity of results.
Additional Area 2 Location: chin
Dilution (U/0.1 Cc): 5
Expiration Date (Month Year): 04/2024
Giuseppe Units: 0
Price Per Unit In $ (Use Numbers Only, No Text Please.): 17
Additional Area 3 Location: necklace lines
Glabellar Complex Units: 10
Topical Anesthesia?: 23% lidocaine, 7% tetracaine
Additional Area 1 Location: nasalis depressor
Periorbital Skin Units: 15
Show Price In Note?: yes
Lot #: [id number]
Map Statment: See attached map for complete details
Bill Summary Price Listed Below, Or Bill Total Of Units X Price Per Unit?: Bill #Units x Price Per Unit
Administered By (Optional): Maria T Jaeger
Detail Level: Simple
Postcare Instructions: Patient instructed to not lie down for 4 hours and limit physical activity for 24 hours. Patient instructed not to travel by airplane for 48 hours.

## 2022-06-07 ENCOUNTER — APPOINTMENT (RX ONLY)
Dept: URBAN - METROPOLITAN AREA CLINIC 12 | Facility: CLINIC | Age: 77
Setting detail: DERMATOLOGY
End: 2022-06-07

## 2022-06-07 DIAGNOSIS — Z41.9 ENCOUNTER FOR PROCEDURE FOR PURPOSES OTHER THAN REMEDYING HEALTH STATE, UNSPECIFIED: ICD-10-CM

## 2022-06-07 PROCEDURE — ? BOTOX

## 2022-06-07 NOTE — PROCEDURE: BOTOX
Additional Area 2 Units: 0
Periorbital Skin Units: 15
Consent: Written consent was obtained prior to the procedure. Risks, benefits, expectations and alternatives were discussed including, but not limited to, infection, bleeding, lid/brow ptosis, bruising, swelling, diplopia, temporary effects, incomplete chemical denervation and dissatisfaction with the cosmetic outcome. No guarantee or warranty was given or implied regarding longevity of results.
Detail Level: Simple
Show Price In Note?: yes
Price Per Unit In $ (Use Numbers Only, No Text Please.): 17
Administered By (Optional): Maria T Jaeger
Additional Area 1 Location: upper lip
Postcare Instructions: Patient instructed to not lie down for 4 hours and limit physical activity for 24 hours. Patient instructed not to travel by airplane for 48 hours.
Bill Summary Price Listed Below, Or Bill Total Of Units X Price Per Unit?: Bill #Units x Price Per Unit
Glabellar Complex Units: 12.5
Additional Area 2 Location: chin
Expiration Date (Month Year): 10/2023
Dilution (U/0.1 Cc): 5
Map Statment: See attached map for complete details
Lot #: [id number]
Additional Area 3 Location: lateral platysmal bands